# Patient Record
Sex: MALE | Race: WHITE | NOT HISPANIC OR LATINO | URBAN - METROPOLITAN AREA
[De-identification: names, ages, dates, MRNs, and addresses within clinical notes are randomized per-mention and may not be internally consistent; named-entity substitution may affect disease eponyms.]

---

## 2017-11-14 ENCOUNTER — OUTPATIENT (OUTPATIENT)
Dept: OUTPATIENT SERVICES | Facility: HOSPITAL | Age: 57
LOS: 1 days | Discharge: HOME | End: 2017-11-14

## 2017-11-14 DIAGNOSIS — E78.00 PURE HYPERCHOLESTEROLEMIA, UNSPECIFIED: ICD-10-CM

## 2017-11-14 DIAGNOSIS — Z00.00 ENCOUNTER FOR GENERAL ADULT MEDICAL EXAMINATION WITHOUT ABNORMAL FINDINGS: ICD-10-CM

## 2017-11-14 DIAGNOSIS — E11.9 TYPE 2 DIABETES MELLITUS WITHOUT COMPLICATIONS: ICD-10-CM

## 2018-05-10 ENCOUNTER — OUTPATIENT (OUTPATIENT)
Dept: OUTPATIENT SERVICES | Facility: HOSPITAL | Age: 58
LOS: 1 days | Discharge: HOME | End: 2018-05-10

## 2018-05-23 DIAGNOSIS — E11.9 TYPE 2 DIABETES MELLITUS WITHOUT COMPLICATIONS: ICD-10-CM

## 2018-05-23 DIAGNOSIS — I10 ESSENTIAL (PRIMARY) HYPERTENSION: ICD-10-CM

## 2018-12-11 ENCOUNTER — OUTPATIENT (OUTPATIENT)
Dept: OUTPATIENT SERVICES | Facility: HOSPITAL | Age: 58
LOS: 1 days | Discharge: HOME | End: 2018-12-11

## 2018-12-11 DIAGNOSIS — E11.65 TYPE 2 DIABETES MELLITUS WITH HYPERGLYCEMIA: ICD-10-CM

## 2018-12-11 DIAGNOSIS — E78.5 HYPERLIPIDEMIA, UNSPECIFIED: ICD-10-CM

## 2018-12-11 DIAGNOSIS — N50.819 TESTICULAR PAIN, UNSPECIFIED: ICD-10-CM

## 2018-12-11 DIAGNOSIS — E04.0 NONTOXIC DIFFUSE GOITER: ICD-10-CM

## 2018-12-11 DIAGNOSIS — E29.1 TESTICULAR HYPOFUNCTION: ICD-10-CM

## 2018-12-11 DIAGNOSIS — I10 ESSENTIAL (PRIMARY) HYPERTENSION: ICD-10-CM

## 2018-12-11 DIAGNOSIS — N50.811 RIGHT TESTICULAR PAIN: ICD-10-CM

## 2019-03-03 ENCOUNTER — EMERGENCY (EMERGENCY)
Facility: HOSPITAL | Age: 59
LOS: 0 days | Discharge: HOME | End: 2019-03-04
Attending: EMERGENCY MEDICINE | Admitting: EMERGENCY MEDICINE

## 2019-03-03 VITALS
DIASTOLIC BLOOD PRESSURE: 66 MMHG | TEMPERATURE: 98 F | WEIGHT: 309.97 LBS | RESPIRATION RATE: 18 BRPM | OXYGEN SATURATION: 98 % | HEIGHT: 66 IN | SYSTOLIC BLOOD PRESSURE: 119 MMHG | HEART RATE: 104 BPM

## 2019-03-03 DIAGNOSIS — E11.9 TYPE 2 DIABETES MELLITUS WITHOUT COMPLICATIONS: ICD-10-CM
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
## 2019-03-03 DIAGNOSIS — E78.5 HYPERLIPIDEMIA, UNSPECIFIED: ICD-10-CM
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
## 2019-03-03 DIAGNOSIS — M79.669 PAIN IN UNSPECIFIED LOWER LEG: ICD-10-CM
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
## 2019-03-03 DIAGNOSIS — S80.12XA CONTUSION OF LEFT LOWER LEG, INITIAL ENCOUNTER: ICD-10-CM

## 2019-03-03 NOTE — ED ADULT TRIAGE NOTE - CHIEF COMPLAINT QUOTE
I hurt myself in the [facility] building two Thursday's ago, I didn't fall but I tripped. My alexander t a little different - patient

## 2019-03-04 VITALS — HEART RATE: 84 BPM | OXYGEN SATURATION: 99 % | RESPIRATION RATE: 18 BRPM

## 2019-03-04 LAB
BASOPHILS # BLD AUTO: 0.1 K/UL — SIGNIFICANT CHANGE UP (ref 0–0.2)
BASOPHILS NFR BLD AUTO: 1.3 % — HIGH (ref 0–1)
D DIMER BLD IA.RAPID-MCNC: 284 NG/ML DDU — HIGH (ref 0–230)
EOSINOPHIL # BLD AUTO: 0.28 K/UL — SIGNIFICANT CHANGE UP (ref 0–0.7)
EOSINOPHIL NFR BLD AUTO: 3.5 % — SIGNIFICANT CHANGE UP (ref 0–8)
HCT VFR BLD CALC: 42.3 % — SIGNIFICANT CHANGE UP (ref 42–52)
HGB BLD-MCNC: 14 G/DL — SIGNIFICANT CHANGE UP (ref 14–18)
IMM GRANULOCYTES NFR BLD AUTO: 1.5 % — HIGH (ref 0.1–0.3)
LYMPHOCYTES # BLD AUTO: 2.46 K/UL — SIGNIFICANT CHANGE UP (ref 1.2–3.4)
LYMPHOCYTES # BLD AUTO: 30.9 % — SIGNIFICANT CHANGE UP (ref 20.5–51.1)
MCHC RBC-ENTMCNC: 30.2 PG — SIGNIFICANT CHANGE UP (ref 27–31)
MCHC RBC-ENTMCNC: 33.1 G/DL — SIGNIFICANT CHANGE UP (ref 32–37)
MCV RBC AUTO: 91.2 FL — SIGNIFICANT CHANGE UP (ref 80–94)
MONOCYTES # BLD AUTO: 0.74 K/UL — HIGH (ref 0.1–0.6)
MONOCYTES NFR BLD AUTO: 9.3 % — SIGNIFICANT CHANGE UP (ref 1.7–9.3)
NEUTROPHILS # BLD AUTO: 4.26 K/UL — SIGNIFICANT CHANGE UP (ref 1.4–6.5)
NEUTROPHILS NFR BLD AUTO: 53.5 % — SIGNIFICANT CHANGE UP (ref 42.2–75.2)
NRBC # BLD: 0 /100 WBCS — SIGNIFICANT CHANGE UP (ref 0–0)
PLATELET # BLD AUTO: 256 K/UL — SIGNIFICANT CHANGE UP (ref 130–400)
RBC # BLD: 4.64 M/UL — LOW (ref 4.7–6.1)
RBC # FLD: 13.8 % — SIGNIFICANT CHANGE UP (ref 11.5–14.5)
WBC # BLD: 7.96 K/UL — SIGNIFICANT CHANGE UP (ref 4.8–10.8)
WBC # FLD AUTO: 7.96 K/UL — SIGNIFICANT CHANGE UP (ref 4.8–10.8)

## 2019-03-04 NOTE — ED ADULT NURSE NOTE - NS ED NURSE RECORD ANOTHER VITAL SIGN
I sent a 3 day packet and the social work consult to [facility] via [facility] for an update. Mariel Lopes LMSW     Yes

## 2019-03-04 NOTE — ED PROVIDER NOTE - OBJECTIVE STATEMENT
59 yo M with history of DM II, congenital unilateral kidney, HLD, here for assessment of ecchymosis to LLE. Patient had twisting injury to LLE after catching his toe on the carpet and nearly falling over 2 weeks ago. Had pain to entire LLE at that time, worse with ambulating, associated with 2 large bruises to posteromedial leg, 1 just above popliteal fossa and one just below. Over the last 2 weeks bruising has slowly moved distally, now noticed extreme darkening of toes 2-4 on left foot.     Pain has resolved, ROM full, no change in temperature or sensation to lower extremity.

## 2019-03-04 NOTE — ED PROVIDER NOTE - CARE PLAN
Assessment and plan of treatment:	57 yo M with extensive LE bruising -- likely had partial tendon/muscle tear at initial injury, now with gravity dependent bruising. However, given calf fullness, will need to rule out DVT Principal Discharge DX:	Ecchymosis  Assessment and plan of treatment:	57 yo M with extensive LE bruising -- likely had partial tendon/muscle tear at initial injury, now with gravity dependent bruising. However, given calf fullness, will need to rule out DVT

## 2019-03-04 NOTE — ED PROVIDER NOTE - PLAN OF CARE
59 yo M with extensive LE bruising -- likely had partial tendon/muscle tear at initial injury, now with gravity dependent bruising. However, given calf fullness, will need to rule out DVT

## 2019-03-04 NOTE — ED PROVIDER NOTE - CARE PROVIDER_API CALL
Hany Saavedra (MD)  Surgery  [address] [place] [address]  Phone: [phone]  Fax: [phone]  Follow Up Time:

## 2019-03-04 NOTE — ED PROVIDER NOTE - PROGRESS NOTE DETAILS
D dimer positive, Bedside duplex negative. Patient to follow up with ortho for further assessment of likely muscle, tendon injury. Advised on return precautions.

## 2019-03-04 NOTE — ED ADULT NURSE NOTE - NSIMPLEMENTINTERV_GEN_ALL_ED
Implemented All Universal Safety Interventions:  [place] to call system. Call bell, personal items and telephone within reach. Instruct patient to call for assistance. Room bathroom lighting operational. Non-slip footwear when patient is off stretcher. Physically safe environment: no spills, clutter or unnecessary equipment. Stretcher in lowest position, wheels locked, appropriate side rails in place.

## 2019-03-04 NOTE — ED PROVIDER NOTE - PHYSICAL EXAMINATION
VITAL SIGNS: I have reviewed nursing notes and confirm.  CONSTITUTIONAL: Obese, well-developed; well-nourished; in no acute distress.  SKIN: scattered ecchymosis to LLE distal to knee with extensive dark ecchymosis around posterior foot at level of acchiles and to toes 2-4, no broken skin, good cap refill   HEAD: Normocephalic; atraumatic.  CARD: RRR, no murmur  RESP: No wheezes, rales or rhonchi.  ABD: Normal bowel sounds; soft; non-distended; non-tender  EXT: see skin, Normal ROM, mild fullness to L calf when compared to right, good pulses, neurologically intact distally  NEURO: Alert, oriented. Grossly unremarkable. No focal deficits.  PSYCH: Cooperative, appropriate.

## 2019-03-04 NOTE — ED PROVIDER NOTE - NSFOLLOWUPINSTRUCTIONS_ED_ALL_ED_FT
What is ecchymosis? Ecchymosis is a collection of blood under the skin. Blood leaks from blood vessels and collects in nearby tissues. This can happen anywhere just below the skin, or in a mucus membrane, such as your mouth. Ecchymosis may appear as a large red, blue, or purple area of skin. You may also have pain or swelling in the area. Signs and symptoms may move to nearby body areas.    What causes ecchymosis?     A trauma, such as being hit with a blunt object, or an animal bite      A medical condition, such as a low platelet count, blood clotting disorder, or cancer      Certain medicines, such as warfarin, steroids, or aspirin      Lack of vitamin K or vitamin C      An infection, such as [place] spotted fever    How is the cause of ecchymosis diagnosed? Your healthcare provider will examine the affected areas and ask when your symptoms began. Tell him if you had a recent trauma or you have a medical condition that can cause ecchymosis. Tell him about all the medicines you take and if you noticed signs or symptoms begin after you took a medicine. You may need blood tests to check your blood cells or measure the amount of inflammation. The tests may also show signs of infection or test how well your blood clots.    How is ecchymosis treated? Ecchymosis usually does not need treatment. Your healthcare provider may want you to have more tests to find the cause if you get ecchymosis often or it is painful. The medical condition causing ecchymosis may need to be treated. Your healthcare provider may stop or change a medicine that is causing your ecchymosis. The following may help relieve your symptoms:     Rest the area to help the tissues heal.      Apply ice to the area to relieve pain and swelling. Ice can also help prevent tissue damage. Use an ice pack, or put crushed ice in a bag. Cover the ice pack or bag with a small towel before you apply it to your skin. Apply ice for 20 minutes every hour, or as directed.       Elevate the affected area to reduce swelling, and to improve circulation. Prop the area on pillows to keep it elevated above the level of your heart. Do this as often as possible.      NSAID medicines such as ibuprofen can help reduce pain and swelling. NSAIDs are available without a prescription. Ask your healthcare provider which medicine is right for you. Ask how much to take and how often to take it. Follow directions. NSAIDs can cause stomach bleeding and kidney damage if not taken correctly. If you take blood thinner medicine, always ask if NSAIDs are safe for you.    When should I contact my healthcare provider?     You have new symptoms.      Your bruise suddenly gets bigger and feels hard.       The affected area does not improve within 2 weeks.      You have ecchymosis around your eye and you are having trouble seeing.      You have questions or concerns about your condition or care.    CARE AGREEMENT:    You have the right to help plan your care. Learn about your health condition and how it may be treated. Discuss treatment options with your healthcare providers to decide what care you want to receive. You always have the right to refuse treatment.

## 2019-03-04 NOTE — ED PROVIDER NOTE - CLINICAL SUMMARY MEDICAL DECISION MAKING FREE TEXT BOX
D dimer positive, Bedside duplex negative. Patient to follow up with ortho for further assessment of likely muscle, tendon injury. Advised on return precautions. No signs of cellulitis, compartments are soft.

## 2019-03-04 NOTE — ED ADULT NURSE NOTE - CHIEF COMPLAINT QUOTE
I hurt myself in the [facility] building two Thursday's ago, I didn't fall but I tripped. My alexander t a little different - patient

## 2019-03-04 NOTE — ED PROVIDER NOTE - NS ED ROS FT
Constitutional: no fever, chills, no recent weight loss, change in appetite or malaise  Cardiac: No chest pain, SOB or edema.  Respiratory: No cough or respiratory distress  GI: No nausea, vomiting, diarrhea or abdominal pain.  : No dysuria, frequency, urgency or hematuria  MS: see HPI  Neuro: No headache or weakness. No LOC.  Skin: see HPI  Except as documented in the HPI, all other systems are negative.

## 2019-05-11 ENCOUNTER — OUTPATIENT (OUTPATIENT)
Dept: OUTPATIENT SERVICES | Facility: HOSPITAL | Age: 59
LOS: 1 days | Discharge: HOME | End: 2019-05-11
Payer: COMMERCIAL

## 2019-05-11 DIAGNOSIS — G57.01 LESION OF SCIATIC NERVE, RIGHT LOWER LIMB: ICD-10-CM

## 2019-05-11 DIAGNOSIS — M46.96 UNSPECIFIED INFLAMMATORY SPONDYLOPATHY, LUMBAR REGION: ICD-10-CM

## 2019-05-11 PROCEDURE — 72202 X-RAY EXAM SI JOINTS 3/> VWS: CPT | Mod: 26

## 2019-05-11 PROCEDURE — 73522 X-RAY EXAM HIPS BI 3-4 VIEWS: CPT | Mod: 26

## 2019-05-11 PROCEDURE — 72110 X-RAY EXAM L-2 SPINE 4/>VWS: CPT | Mod: 26

## 2019-08-29 PROBLEM — Z00.00 ENCOUNTER FOR PREVENTIVE HEALTH EXAMINATION: Status: ACTIVE | Noted: 2019-08-29

## 2019-09-11 ENCOUNTER — APPOINTMENT (OUTPATIENT)
Dept: SURGERY | Facility: CLINIC | Age: 59
End: 2019-09-11
Payer: COMMERCIAL

## 2019-09-11 VITALS
BODY MASS INDEX: 50.62 KG/M2 | SYSTOLIC BLOOD PRESSURE: 104 MMHG | HEIGHT: 66 IN | DIASTOLIC BLOOD PRESSURE: 76 MMHG | WEIGHT: 315 LBS

## 2019-09-11 DIAGNOSIS — L72.3 SEBACEOUS CYST: ICD-10-CM

## 2019-09-11 DIAGNOSIS — E66.01 MORBID (SEVERE) OBESITY DUE TO EXCESS CALORIES: ICD-10-CM

## 2019-09-11 PROCEDURE — 99213 OFFICE O/P EST LOW 20 MIN: CPT

## 2019-09-13 PROBLEM — L72.3 SEBACEOUS CYST: Status: ACTIVE | Noted: 2019-09-13

## 2019-09-13 PROBLEM — E66.01 MORBID OBESITY: Status: ACTIVE | Noted: 2019-09-13

## 2019-09-13 NOTE — ASSESSMENT
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 He was started on cephalosporin and the pain and redness has gone away after 10 days of abx. \par \par exam: 2 cm scar in the back. Maybe the previous site of infected sebacious cyst. \par \par We explained in great detail the pathophysiology of the disease process. We nithya diagrams and discussed the workup for diagnosis and management.\par The options were explained to the patient. The Risk , benefit and alternatives were discussed. We discussed recovery and possible complications.\par \par We decided to watch it for now and not go for excision.\par \par We discussed the importance of close follow up. \par We informed that he needs to follow up as needed. \par We also informed that he can call us if anything changes or has any questions.\par

## 2019-09-13 NOTE — PHYSICAL EXAM
[JVD] : no jugular venous distention  [Respiratory Effort] : normal respiratory effort [Purpura] : no purpura  [Alert] : alert [Calm] : calm [de-identified] : Normal [de-identified] : Normal.  [de-identified] : Normal [de-identified] : 2 cm scar in the back. Maybe the previous site of infected sebacious cyst.

## 2019-09-13 NOTE — HISTORY OF PRESENT ILLNESS
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 He was started on cephalosporin and the pain and redness has gone away after 10 days of abx.

## 2020-01-22 ENCOUNTER — TRANSCRIPTION ENCOUNTER (OUTPATIENT)
Age: 60
End: 2020-01-22

## 2020-04-25 ENCOUNTER — MESSAGE (OUTPATIENT)
Age: 60
End: 2020-04-25

## 2020-05-03 LAB
SARS-COV-2 IGG SERPL IA-ACNC: 0.1 INDEX
SARS-COV-2 IGG SERPL QL IA: NEGATIVE

## 2021-09-19 ENCOUNTER — TRANSCRIPTION ENCOUNTER (OUTPATIENT)
Age: 61
End: 2021-09-19

## 2021-09-22 ENCOUNTER — APPOINTMENT (OUTPATIENT)
Dept: UROLOGY | Facility: CLINIC | Age: 61
End: 2021-09-22
Payer: COMMERCIAL

## 2021-09-22 VITALS — HEIGHT: 66 IN | WEIGHT: 290 LBS | BODY MASS INDEX: 46.61 KG/M2

## 2021-09-22 PROCEDURE — 99203 OFFICE O/P NEW LOW 30 MIN: CPT

## 2021-09-22 RX ORDER — PODOFILOX 5 MG/G
0.5 GEL TOPICAL TWICE DAILY
Qty: 1 | Refills: 3 | Status: ACTIVE | COMMUNITY
Start: 2021-09-22 | End: 1900-01-01

## 2021-09-22 NOTE — ASSESSMENT
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

## 2021-11-03 ENCOUNTER — APPOINTMENT (OUTPATIENT)
Dept: UROLOGY | Facility: CLINIC | Age: 61
End: 2021-11-03
Payer: COMMERCIAL

## 2021-11-03 VITALS — WEIGHT: 285 LBS | BODY MASS INDEX: 45.8 KG/M2 | HEIGHT: 66 IN

## 2021-11-03 PROCEDURE — 99213 OFFICE O/P EST LOW 20 MIN: CPT

## 2021-11-03 NOTE — HISTORY OF PRESENT ILLNESS
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 \par \par condylox cream was rx but too expensive -- he went back on aldera to warty lesions helped significantly\par \par

## 2021-11-03 NOTE — ASSESSMENT
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 \par \par condylox cream was rx but too expensive -- he went back on aldera to warty lesions helped significantly\par

## 2021-12-21 ENCOUNTER — RX RENEWAL (OUTPATIENT)
Age: 61
End: 2021-12-21

## 2021-12-21 DIAGNOSIS — E11.9 TYPE 2 DIABETES MELLITUS W/OUT COMPLICATIONS: ICD-10-CM

## 2021-12-21 DIAGNOSIS — E11.37X2 TYPE 2 DIABETES MELLITUS WITH DIABETIC MACULAR EDEMA, RESOLVED FOLLOWING TREATMENT, LEFT EYE: ICD-10-CM

## 2021-12-21 RX ORDER — EMPAGLIFLOZIN 25 MG/1
25 TABLET, FILM COATED ORAL
Qty: 90 | Refills: 2 | Status: ACTIVE | COMMUNITY
Start: 2021-12-21 | End: 1900-01-01

## 2022-01-05 DIAGNOSIS — Z20.822 CONTACT WITH AND (SUSPECTED) EXPOSURE TO COVID-19: ICD-10-CM

## 2022-01-06 ENCOUNTER — RX RENEWAL (OUTPATIENT)
Age: 62
End: 2022-01-06

## 2022-01-06 LAB — SARS-COV-2 N GENE NPH QL NAA+PROBE: NOT DETECTED

## 2022-01-06 RX ORDER — GLIMEPIRIDE 2 MG/1
2 TABLET ORAL
Qty: 90 | Refills: 2 | Status: ACTIVE | COMMUNITY
Start: 2022-01-06 | End: 1900-01-01

## 2022-01-06 RX ORDER — VALSARTAN 80 MG/1
80 TABLET, COATED ORAL
Qty: 90 | Refills: 2 | Status: ACTIVE | COMMUNITY
Start: 2022-01-06 | End: 1900-01-01

## 2022-01-18 ENCOUNTER — RX RENEWAL (OUTPATIENT)
Age: 62
End: 2022-01-18

## 2022-01-18 RX ORDER — FINASTERIDE 1 MG/1
1 TABLET ORAL
Qty: 90 | Refills: 3 | Status: ACTIVE | COMMUNITY
Start: 2022-01-18 | End: 1900-01-01

## 2022-01-24 ENCOUNTER — RX RENEWAL (OUTPATIENT)
Age: 62
End: 2022-01-24

## 2022-01-24 RX ORDER — ATORVASTATIN CALCIUM 20 MG/1
20 TABLET, FILM COATED ORAL
Qty: 90 | Refills: 2 | Status: ACTIVE | COMMUNITY
Start: 2022-01-18 | End: 1900-01-01

## 2022-01-26 ENCOUNTER — APPOINTMENT (OUTPATIENT)
Dept: UROLOGY | Facility: CLINIC | Age: 62
End: 2022-01-26
Payer: COMMERCIAL

## 2022-01-26 VITALS — BODY MASS INDEX: 44.68 KG/M2 | HEIGHT: 66 IN | WEIGHT: 278 LBS

## 2022-01-26 DIAGNOSIS — Z86.19 PERSONAL HISTORY OF OTHER INFECTIOUS AND PARASITIC DISEASES: ICD-10-CM

## 2022-01-26 PROCEDURE — 99213 OFFICE O/P EST LOW 20 MIN: CPT

## 2022-01-26 NOTE — HISTORY OF PRESENT ILLNESS
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 \par \par condylox cream was rx but too expensive -- he went back on aldera to warty lesions helped significantly

## 2022-02-02 ENCOUNTER — APPOINTMENT (OUTPATIENT)
Dept: UROLOGY | Facility: CLINIC | Age: 62
End: 2022-02-02
Payer: COMMERCIAL

## 2022-02-02 VITALS — BODY MASS INDEX: 44.68 KG/M2 | HEIGHT: 66 IN | WEIGHT: 278 LBS

## 2022-02-02 LAB — SARS-COV-2 N GENE NPH QL NAA+PROBE: NOT DETECTED

## 2022-02-02 PROCEDURE — 99211 OFF/OP EST MAY X REQ PHY/QHP: CPT

## 2022-02-03 ENCOUNTER — RX RENEWAL (OUTPATIENT)
Age: 62
End: 2022-02-03

## 2022-02-04 ENCOUNTER — RX RENEWAL (OUTPATIENT)
Age: 62
End: 2022-02-04

## 2022-02-04 NOTE — HISTORY OF PRESENT ILLNESS
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 \par \par condylox cream was rx but too expensive -- he went back on aldera to warty lesions helped significantly. \par \par  \par Podofilox applied today and last visit -- with signficant improvement

## 2022-02-07 ENCOUNTER — RX RENEWAL (OUTPATIENT)
Age: 62
End: 2022-02-07

## 2022-02-07 RX ORDER — METFORMIN HYDROCHLORIDE 1000 MG/1
1000 TABLET, COATED ORAL
Qty: 180 | Refills: 2 | Status: ACTIVE | COMMUNITY
Start: 2022-01-18 | End: 1900-01-01

## 2022-02-09 ENCOUNTER — APPOINTMENT (OUTPATIENT)
Dept: UROLOGY | Facility: CLINIC | Age: 62
End: 2022-02-09
Payer: COMMERCIAL

## 2022-02-09 VITALS — WEIGHT: 278 LBS | TEMPERATURE: 96 F | HEIGHT: 66 IN | BODY MASS INDEX: 44.68 KG/M2

## 2022-02-09 PROCEDURE — 99212 OFFICE O/P EST SF 10 MIN: CPT

## 2022-02-09 NOTE — ASSESSMENT
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 \par  \par Podofilox applied today and last visit -- with significant improvement. about 90% clear at this point\par

## 2022-02-16 ENCOUNTER — APPOINTMENT (OUTPATIENT)
Dept: UROLOGY | Facility: CLINIC | Age: 62
End: 2022-02-16
Payer: COMMERCIAL

## 2022-02-16 VITALS — BODY MASS INDEX: 45 KG/M2 | HEIGHT: 66 IN | WEIGHT: 280 LBS

## 2022-02-16 PROCEDURE — 99212 OFFICE O/P EST SF 10 MIN: CPT

## 2022-02-16 NOTE — ASSESSMENT
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 \par  \par Podofilox applied today and last visit --\par lesions worse this visit\par

## 2022-02-23 ENCOUNTER — APPOINTMENT (OUTPATIENT)
Dept: UROLOGY | Facility: CLINIC | Age: 62
End: 2022-02-23
Payer: COMMERCIAL

## 2022-02-23 VITALS — WEIGHT: 280 LBS | HEIGHT: 66 IN | BODY MASS INDEX: 45 KG/M2 | TEMPERATURE: 96.5 F

## 2022-02-23 PROCEDURE — 99212 OFFICE O/P EST SF 10 MIN: CPT

## 2022-02-23 NOTE — ASSESSMENT
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 \par  \par Podofilox applied today and last visit --\par lesions worse this visit\par . \par he was able to apply himself twice and the results are much better today \par

## 2022-03-09 ENCOUNTER — APPOINTMENT (OUTPATIENT)
Dept: UROLOGY | Facility: CLINIC | Age: 62
End: 2022-03-09
Payer: COMMERCIAL

## 2022-03-09 VITALS — BODY MASS INDEX: 44.68 KG/M2 | WEIGHT: 278 LBS | HEIGHT: 66 IN

## 2022-03-09 PROCEDURE — 99212 OFFICE O/P EST SF 10 MIN: CPT

## 2022-03-09 NOTE — HISTORY OF PRESENT ILLNESS
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 \par  \par Podofilox applied today and last visit --\par . \par he was able to apply himself twice and the results are much better today \par . \par veregen -- he ordered from [facility] and he will trial -- \par \par

## 2022-06-15 ENCOUNTER — APPOINTMENT (OUTPATIENT)
Dept: UROLOGY | Facility: CLINIC | Age: 62
End: 2022-06-15

## 2022-07-21 ENCOUNTER — OUTPATIENT (OUTPATIENT)
Dept: OUTPATIENT SERVICES | Facility: HOSPITAL | Age: 62
LOS: 1 days | Discharge: HOME | End: 2022-07-21

## 2022-07-21 DIAGNOSIS — R63.4 ABNORMAL WEIGHT LOSS: ICD-10-CM

## 2022-07-21 PROCEDURE — 74170 CT ABD WO CNTRST FLWD CNTRST: CPT | Mod: 26

## 2023-09-13 ENCOUNTER — TRANSCRIPTION ENCOUNTER (OUTPATIENT)
Age: 63
End: 2023-09-13

## 2024-02-02 DIAGNOSIS — A63.0 ANOGENITAL (VENEREAL) WARTS: ICD-10-CM

## 2024-09-05 ENCOUNTER — TRANSCRIPTION ENCOUNTER (OUTPATIENT)
Age: 64
End: 2024-09-05

## 2024-09-19 ENCOUNTER — TRANSCRIPTION ENCOUNTER (OUTPATIENT)
Age: 64
End: 2024-09-19

## 2025-04-05 ENCOUNTER — APPOINTMENT (OUTPATIENT)
Age: 65
End: 2025-04-05
Payer: COMMERCIAL

## 2025-04-05 DIAGNOSIS — E11.9 TYPE 2 DIABETES MELLITUS W/OUT COMPLICATIONS: ICD-10-CM

## 2025-04-05 DIAGNOSIS — M20.41 OTHER HAMMER TOE(S) (ACQUIRED), RIGHT FOOT: ICD-10-CM

## 2025-04-05 DIAGNOSIS — M79.671 PAIN IN RIGHT FOOT: ICD-10-CM

## 2025-04-05 DIAGNOSIS — B35.1 TINEA UNGUIUM: ICD-10-CM

## 2025-04-05 DIAGNOSIS — M79.672 PAIN IN LEFT FOOT: ICD-10-CM

## 2025-04-05 DIAGNOSIS — M20.42 OTHER HAMMER TOE(S) (ACQUIRED), LEFT FOOT: ICD-10-CM

## 2025-04-05 PROCEDURE — 11720 DEBRIDE NAIL 1-5: CPT

## 2025-04-05 PROCEDURE — 99213 OFFICE O/P EST LOW 20 MIN: CPT | Mod: 25

## 2025-07-14 ENCOUNTER — APPOINTMENT (OUTPATIENT)
Age: 65
End: 2025-07-14
Payer: COMMERCIAL

## 2025-07-14 PROBLEM — M72.2 PLANTAR FASCIITIS, RIGHT: Status: ACTIVE | Noted: 2025-07-14

## 2025-07-14 PROBLEM — E11.42 TYPE 2 DIABETES MELLITUS WITH DIABETIC POLYNEUROPATHY: Status: ACTIVE | Noted: 2025-07-14

## 2025-07-14 PROBLEM — Z86.39 HISTORY OF TYPE 2 DIABETES MELLITUS: Status: RESOLVED | Noted: 2021-12-21 | Resolved: 2025-07-14

## 2025-07-14 PROBLEM — M77.31 CALCANEAL SPUR OF RIGHT FOOT: Status: ACTIVE | Noted: 2025-07-14

## 2025-07-14 PROCEDURE — 99213 OFFICE O/P EST LOW 20 MIN: CPT | Mod: 25

## 2025-07-14 PROCEDURE — 73630 X-RAY EXAM OF FOOT: CPT | Mod: RT

## 2025-07-14 PROCEDURE — 11720 DEBRIDE NAIL 1-5: CPT | Mod: Q9

## 2025-07-14 RX ORDER — L-METHYLFOLATE-ALGAE-VIT B12-B6 CAP 3-90.314-2-35 MG 3-90.314-2-35 MG
3-90.314-2-35 CAP ORAL
Qty: 90 | Refills: 3 | Status: ACTIVE | COMMUNITY
Start: 2025-07-14 | End: 1900-01-01

## 2025-08-26 ENCOUNTER — APPOINTMENT (OUTPATIENT)
Dept: CARDIOLOGY | Facility: CLINIC | Age: 65
End: 2025-08-26
Payer: COMMERCIAL

## 2025-08-26 VITALS
HEIGHT: 66 IN | DIASTOLIC BLOOD PRESSURE: 72 MMHG | WEIGHT: 270 LBS | SYSTOLIC BLOOD PRESSURE: 115 MMHG | BODY MASS INDEX: 43.39 KG/M2 | HEART RATE: 99 BPM

## 2025-08-26 DIAGNOSIS — Z13.6 ENCOUNTER FOR SCREENING FOR CARDIOVASCULAR DISORDERS: ICD-10-CM

## 2025-08-26 DIAGNOSIS — Z80.1 FAMILY HISTORY OF MALIGNANT NEOPLASM OF TRACHEA, BRONCHUS AND LUNG: ICD-10-CM

## 2025-08-26 DIAGNOSIS — Z78.9 OTHER SPECIFIED HEALTH STATUS: ICD-10-CM

## 2025-08-26 DIAGNOSIS — I10 ESSENTIAL (PRIMARY) HYPERTENSION: ICD-10-CM

## 2025-08-26 DIAGNOSIS — E78.5 HYPERLIPIDEMIA, UNSPECIFIED: ICD-10-CM

## 2025-08-26 DIAGNOSIS — I45.10 UNSPECIFIED RIGHT BUNDLE-BRANCH BLOCK: ICD-10-CM

## 2025-08-26 PROCEDURE — 93000 ELECTROCARDIOGRAM COMPLETE: CPT

## 2025-08-26 PROCEDURE — 99204 OFFICE O/P NEW MOD 45 MIN: CPT

## 2025-08-26 RX ORDER — SEMAGLUTIDE 1.34 MG/ML
2 INJECTION, SOLUTION SUBCUTANEOUS
Refills: 0 | Status: ACTIVE | COMMUNITY

## 2025-08-27 PROBLEM — Z80.1 FAMILY HISTORY OF LUNG CANCER: Status: ACTIVE | Noted: 2025-08-27

## 2025-08-27 PROBLEM — Z78.9 NON-SMOKER: Status: ACTIVE | Noted: 2025-08-27
